# Patient Record
Sex: MALE | Race: WHITE | NOT HISPANIC OR LATINO | Employment: FULL TIME | URBAN - METROPOLITAN AREA
[De-identification: names, ages, dates, MRNs, and addresses within clinical notes are randomized per-mention and may not be internally consistent; named-entity substitution may affect disease eponyms.]

---

## 2023-03-22 PROBLEM — R63.4 WEIGHT LOSS, UNINTENTIONAL: Status: ACTIVE | Noted: 2023-03-22

## 2023-03-22 PROBLEM — F43.23 ADJUSTMENT DISORDER WITH MIXED ANXIETY AND DEPRESSED MOOD: Status: ACTIVE | Noted: 2023-03-22

## 2023-03-22 PROBLEM — E78.5 HYPERLIPIDEMIA: Status: ACTIVE | Noted: 2023-03-22

## 2023-03-22 PROBLEM — E66.9 CLASS 1 OBESITY WITH BODY MASS INDEX (BMI) OF 33.0 TO 33.9 IN ADULT: Status: ACTIVE | Noted: 2023-03-22

## 2023-03-22 PROBLEM — E55.9 VITAMIN D DEFICIENCY: Status: ACTIVE | Noted: 2023-03-22

## 2023-03-22 PROBLEM — N23 URETERAL COLIC: Status: ACTIVE | Noted: 2023-03-22

## 2023-03-22 PROBLEM — I10 BENIGN ESSENTIAL HTN: Status: ACTIVE | Noted: 2023-03-22

## 2023-03-22 PROBLEM — F41.9 ANXIETY DISORDER: Status: ACTIVE | Noted: 2023-03-22

## 2023-03-22 PROBLEM — N20.0 NEPHROLITHIASIS: Status: ACTIVE | Noted: 2023-03-22

## 2023-03-22 PROBLEM — R53.83 FATIGUE: Status: ACTIVE | Noted: 2023-03-22

## 2023-03-22 PROBLEM — M54.50 LOW BACK PAIN: Status: ACTIVE | Noted: 2023-03-22

## 2023-03-22 PROBLEM — F39 MOOD DISORDER (CMS-HCC): Status: ACTIVE | Noted: 2023-03-22

## 2023-03-22 PROBLEM — G47.33 OSA ON CPAP: Status: ACTIVE | Noted: 2023-03-22

## 2023-03-22 PROBLEM — G47.00 INSOMNIA: Status: ACTIVE | Noted: 2023-03-22

## 2023-03-22 RX ORDER — ACETAMINOPHEN 500 MG
TABLET ORAL
COMMUNITY
Start: 2022-01-04

## 2023-03-22 RX ORDER — ROSUVASTATIN CALCIUM 20 MG/1
20 TABLET, COATED ORAL NIGHTLY
COMMUNITY

## 2023-03-23 ENCOUNTER — APPOINTMENT (OUTPATIENT)
Dept: PRIMARY CARE | Facility: CLINIC | Age: 43
End: 2023-03-23
Payer: COMMERCIAL

## 2023-04-06 ENCOUNTER — OFFICE VISIT (OUTPATIENT)
Dept: PRIMARY CARE | Facility: CLINIC | Age: 43
End: 2023-04-06
Payer: COMMERCIAL

## 2023-04-06 VITALS
BODY MASS INDEX: 29.99 KG/M2 | DIASTOLIC BLOOD PRESSURE: 82 MMHG | WEIGHT: 209 LBS | HEART RATE: 101 BPM | SYSTOLIC BLOOD PRESSURE: 142 MMHG

## 2023-04-06 DIAGNOSIS — Z78.9 PROFOUND INATTENTION: Primary | ICD-10-CM

## 2023-04-06 PROCEDURE — 99212 OFFICE O/P EST SF 10 MIN: CPT | Performed by: NURSE PRACTITIONER

## 2023-04-06 PROCEDURE — 1036F TOBACCO NON-USER: CPT | Performed by: NURSE PRACTITIONER

## 2023-04-06 PROCEDURE — 3077F SYST BP >= 140 MM HG: CPT | Performed by: NURSE PRACTITIONER

## 2023-04-06 PROCEDURE — 3079F DIAST BP 80-89 MM HG: CPT | Performed by: NURSE PRACTITIONER

## 2023-04-06 NOTE — PATIENT INSTRUCTIONS
Thank you for seeing me today.  It was a pleasure to see you again!    See Psychiatry for evaluation and treatment     RTC AFTER SEEING PSYCHIATRY

## 2023-04-06 NOTE — PROGRESS NOTES
"Subjective   Chief Complaint   Patient presents with    Follow-up     Cam is here today discuss possible ADHD.        Patient ID: Cam Dukes is a 43 y.o. male who presents for Follow-up (Cam is here today discuss possible ADHD. ).    HPI  Est 44 yo male presents today to discuss test results from his therapist and possibly ADD    **Pts appt was yesterday, but came in today    Pt states his mom, maternal Aunt and Maternal Grandmother all have ADD and he is not sure \"what is wrong with him, but really wants to get things figured out\"   Pt has sensitivity to sounds  Pt is unable to stay on track  Starts to do a task and can't remember what he was doing    Pt has only ever taken Lexapro which he stopped d/t no significant improvement in his symptoms and side effects were worse than the benefit     Pt started to take: Curcumin, N-Acetly Methionine, Omega Fish Oil, Nicotine Patch, Vitamin D, Magnesium and MVI about 1 month ago  and states he is feeling @ 70% better     Review of Systems  All 13 systems were reviewed and are within normal limits except positive and pertinent negative responses which are noted below or in HPI.      Objective   BP (!) 154/96   Pulse 101   Wt 94.8 kg (209 lb)   BMI 29.99 kg/m²        Physical Exam  Vitals reviewed.   Cardiovascular:      Pulses: Normal pulses.   Pulmonary:      Effort: Pulmonary effort is normal.   Neurological:      Mental Status: He is alert.   Psychiatric:         Attention and Perception: Attention normal.         Mood and Affect: Mood is anxious.         Speech: Speech is rapid and pressured.         Behavior: Behavior is hyperactive.         Thought Content: Thought content is not delusional. Thought content does not include homicidal or suicidal plan.         Assessment/Plan   Problem List Items Addressed This Visit    None  Visit Diagnoses       Profound inattention    -  Primary    Relevant Orders    Referral to Psychiatry            "

## 2024-07-11 ENCOUNTER — HOSPITAL ENCOUNTER (EMERGENCY)
Facility: HOSPITAL | Age: 44
Discharge: HOME | End: 2024-07-11
Payer: COMMERCIAL

## 2024-07-11 VITALS
WEIGHT: 163.36 LBS | HEART RATE: 94 BPM | SYSTOLIC BLOOD PRESSURE: 140 MMHG | OXYGEN SATURATION: 100 % | BODY MASS INDEX: 24.2 KG/M2 | TEMPERATURE: 98.2 F | DIASTOLIC BLOOD PRESSURE: 94 MMHG | RESPIRATION RATE: 16 BRPM | HEIGHT: 69 IN

## 2024-07-11 DIAGNOSIS — T78.40XA ALLERGIC REACTION, INITIAL ENCOUNTER: Primary | ICD-10-CM

## 2024-07-11 PROCEDURE — 99284 EMERGENCY DEPT VISIT MOD MDM: CPT | Mod: 25

## 2024-07-11 PROCEDURE — 96361 HYDRATE IV INFUSION ADD-ON: CPT

## 2024-07-11 PROCEDURE — 96375 TX/PRO/DX INJ NEW DRUG ADDON: CPT

## 2024-07-11 PROCEDURE — 2500000004 HC RX 250 GENERAL PHARMACY W/ HCPCS (ALT 636 FOR OP/ED): Performed by: NURSE PRACTITIONER

## 2024-07-11 PROCEDURE — 96374 THER/PROPH/DIAG INJ IV PUSH: CPT

## 2024-07-11 RX ORDER — PREDNISONE 20 MG/1
60 TABLET ORAL DAILY
Qty: 15 TABLET | Refills: 0 | Status: SHIPPED | OUTPATIENT
Start: 2024-07-11 | End: 2024-07-16

## 2024-07-11 RX ORDER — FAMOTIDINE 10 MG/ML
20 INJECTION INTRAVENOUS ONCE
Status: COMPLETED | OUTPATIENT
Start: 2024-07-11 | End: 2024-07-11

## 2024-07-11 RX ADMIN — METHYLPREDNISOLONE SODIUM SUCCINATE 125 MG: 125 INJECTION, POWDER, FOR SOLUTION INTRAMUSCULAR; INTRAVENOUS at 20:52

## 2024-07-11 RX ADMIN — FAMOTIDINE 20 MG: 10 INJECTION, SOLUTION INTRAVENOUS at 20:53

## 2024-07-11 RX ADMIN — SODIUM CHLORIDE 1000 ML: 900 INJECTION, SOLUTION INTRAVENOUS at 20:53

## 2024-07-11 ASSESSMENT — PAIN DESCRIPTION - PAIN TYPE: TYPE: ACUTE PAIN

## 2024-07-11 ASSESSMENT — PAIN SCALES - GENERAL: PAINLEVEL_OUTOF10: 0 - NO PAIN

## 2024-07-11 ASSESSMENT — COLUMBIA-SUICIDE SEVERITY RATING SCALE - C-SSRS
2. HAVE YOU ACTUALLY HAD ANY THOUGHTS OF KILLING YOURSELF?: NO
1. IN THE PAST MONTH, HAVE YOU WISHED YOU WERE DEAD OR WISHED YOU COULD GO TO SLEEP AND NOT WAKE UP?: NO
6. HAVE YOU EVER DONE ANYTHING, STARTED TO DO ANYTHING, OR PREPARED TO DO ANYTHING TO END YOUR LIFE?: NO

## 2024-07-11 ASSESSMENT — PAIN - FUNCTIONAL ASSESSMENT: PAIN_FUNCTIONAL_ASSESSMENT: 0-10

## 2024-07-12 NOTE — ED PROVIDER NOTES
HPI   Chief Complaint   Patient presents with    Allergic Reaction     Pt was stung by a bee approximately 1830 on his right abdomen. Pt doesn't think he has an allergy to bee venom, however he has swelling, rash, hives to the general area, and a swollen tongue. Pt airway is clear and pt took 50mg benadryl at home prior to coming to the ER.       HPI  See my MDM                  No data recorded                   Patient History   Past Medical History:   Diagnosis Date    Acute upper respiratory infection, unspecified 11/25/2019    Acute URI    Cutaneous abscess of right hand 02/19/2018    Pulp abscess of finger of right hand    Elevated blood-pressure reading, without diagnosis of hypertension 01/22/2018    Elevated BP without diagnosis of hypertension    Other specified follicular disorders 08/21/2016    Infected sebaceous gland    Personal history of other diseases of the respiratory system 10/13/2013    History of sinusitis    Personal history of other diseases of the respiratory system 12/10/2018    History of acute sinusitis    Personal history of other diseases of the respiratory system 12/21/2017    History of acute pharyngitis    Personal history of other diseases of urinary system 08/14/2018    History of hematuria    Personal history of other infectious and parasitic diseases     History of onychomycosis    Personal history of urinary calculi 08/29/2018    History of renal calculi    Unspecified abdominal pain 08/14/2018    Acute left flank pain     No past surgical history on file.  No family history on file.  Social History     Tobacco Use    Smoking status: Never    Smokeless tobacco: Never   Vaping Use    Vaping status: Never Used   Substance Use Topics    Alcohol use: Yes     Comment: rare    Drug use: Never       Physical Exam   ED Triage Vitals [07/11/24 1924]   Temperature Heart Rate Respirations BP   36.8 °C (98.2 °F) 94 16 (!) 140/94      Pulse Ox Temp Source Heart Rate Source Patient Position    100 % Temporal Monitor Sitting      BP Location FiO2 (%)     Right arm --       Physical Exam  CONSTITUTIONAL: Vital signs reviewed as charted, well-developed and in no distress  Eyes: Extraocular muscles are intact. Pupils equal round and reactive to light. Conjunctiva are pink.    ENT: Mucous membranes are moist. Tongue in the midline. Pharynx was without erythema or exudates, uvula midline  LUNGS: Breath sounds equal and clear to auscultation. Good air exchange, no wheezes rales or retractions, pulse oximetry is charted.  HEART: Regular rate and rhythm without murmur thrill or rub, strong tones, auscultation is normal.  ABDOMEN: Soft and nontender without guarding rebound rigidity or mass. Bowel sounds are present and normal in all quadrants. There is no palpable masses or aneurysms identified. No hepatosplenomegaly, normal abdominal exam.  Neuro: The patient is awake, alert and oriented ×3. Moving all 4 extremities and answering questions appropriately.   MUSCULOSKELETAL: The calves are nontender to palpation. Full gross active range of motion.   PSYCH: Awake alert oriented, normal mood and affect.  Skin:  Dry, normal color, warm to the touch, mild hives present on the anterior torso    ED Course & MDM   Diagnoses as of 07/11/24 2136   Allergic reaction, initial encounter       Medical Decision Making  History obtained from: patient    Vital signs, nursing notes, current medications, past medical history, Surgical history, allergies, social history, family History were reviewed.         HPI:  Patient 44-year-old male present emergency room today for evaluation of allergic reaction to a bee sting.  Stung in the abdomen did have some hives present on anterior torso states he had some tongue swelling and generally not feeling well.  Took 50 mg Benadryl at home and symptoms are vastly improving on arrival.  Does have faint rash present still no difficulty swallowing speaking or breathing.  No facial  swelling.      10 point ROS was reviewed and negative except Noted above in HPI.  DDX: as listed above          MDM Summary/considerations:  Labs Reviewed - No data to display  No orders to display     Medications   sodium chloride 0.9 % bolus 1,000 mL (1,000 mL intravenous New Bag 7/11/24 2053)   methylPREDNISolone sod succinate (SOLU-Medrol) injection 125 mg (125 mg intravenous Given 7/11/24 2052)   famotidine PF (Pepcid) injection 20 mg (20 mg intravenous Given 7/11/24 2053)     New Prescriptions    EPINEPHRINE 0.3 MG/0.3 ML INJECTION SYRINGE    Inject 0.3 mL (0.3 mg) into the muscle if needed for anaphylaxis. Inject into upper leg. Call 911 after use.    PREDNISONE (DELTASONE) 20 MG TABLET    Take 3 tablets (60 mg) by mouth once daily for 5 days.         I estimate there is LOW risk for AIRWAY COMPROMISE, ANAPHYLAXIS, CELLULITIS, EPIGLOTTITIS, NECROTIZING FASCIITIS, MOMO RORY SYNDROME, OR TOXIC EPIDERMAL NECROLYSIS, thus I consider the discharge disposition reasonable. Also, there is no evidence for sepsis or toxicity. We have discussed the diagnosis and risks, and we agree with discharging home to follow-up with their primary doctor. We also discussed returning to the Emergency Department immediately if new or worsening symptoms occur. We have discussed the symptoms which are most concerning (e.g., bloody stool, fever, changing or worsening pain, vomiting) that necessitate immediate return.        Symptoms nearly resolved at this time discharged home prescription for steroids.  Was discharged home in stable condition.    All of the patient's questions were answered to the best of my ability.  Patient states understanding that they have been screened for an emergency today and we have not found any etiology of symptoms that requires emergent treatment or admission to the hospital at this point. They understand that they have not had definitive care day and require follow-up for treatment of their condition.  They also state understanding that they may have an emergent condition that may potentially have not of detected at this visit and they must return to the emergency department if they develop any worsening of symptoms or new complaints.      I have evaluated this patient, my supervising physician was available for consultation.          Critical Care: Not warranted at this time        This chart was completed using voice recognition transcription software. Please excuse any errors of transcription including grammatical, punctuation, syntax and spelling errors.  Please contact me with any questions regarding this chart.    Procedure  Procedures     JENNIE Roa-CNP  07/11/24 1723